# Patient Record
Sex: FEMALE | Race: WHITE | NOT HISPANIC OR LATINO | Employment: PART TIME | URBAN - METROPOLITAN AREA
[De-identification: names, ages, dates, MRNs, and addresses within clinical notes are randomized per-mention and may not be internally consistent; named-entity substitution may affect disease eponyms.]

---

## 2019-08-15 ENCOUNTER — TELEPHONE (OUTPATIENT)
Dept: FAMILY MEDICINE CLINIC | Facility: CLINIC | Age: 29
End: 2019-08-15

## 2019-08-15 DIAGNOSIS — M54.2 NECK PAIN: Primary | ICD-10-CM
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
## 2019-08-26 ENCOUNTER — OFFICE VISIT (OUTPATIENT)
Dept: PHYSICAL THERAPY | Facility: CLINIC | Age: 29
End: 2019-08-26

## 2019-08-26 DIAGNOSIS — M54.2 PAIN, NECK: Primary | ICD-10-CM

## 2019-08-26 PROCEDURE — 97162 PT EVAL MOD COMPLEX 30 MIN: CPT | Performed by: PHYSICAL THERAPIST

## 2019-08-26 PROCEDURE — 97110 THERAPEUTIC EXERCISES: CPT | Performed by: PHYSICAL THERAPIST

## 2019-08-26 PROCEDURE — 97140 MANUAL THERAPY 1/> REGIONS: CPT | Performed by: PHYSICAL THERAPIST
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
## 2019-08-30 ENCOUNTER — OFFICE VISIT (OUTPATIENT)
Dept: PHYSICAL THERAPY | Facility: CLINIC | Age: 29
End: 2019-08-30

## 2019-08-30 DIAGNOSIS — M54.2 PAIN, NECK: Primary | ICD-10-CM

## 2019-08-30 PROCEDURE — 97140 MANUAL THERAPY 1/> REGIONS: CPT | Performed by: PHYSICAL THERAPIST

## 2019-08-30 NOTE — PROGRESS NOTES
Physical Therapy Daily Progress Note    Patient: Miranda Paredes   : 1990  Diagnosis/ICD-10 Code:  Pain, neck [M54.2]  Referring practitioner: Pam VIRGEN*  Date of Initial Visit: Type: THERAPY  Noted: 2019  Today's Date: 2019  Patient seen for 2 sessions         Miranda Paredes reports:  Continued pain in cervical spine and LUE this week.       Objective   See Exercise, Manual, and Modality Logs for complete treatment.       Assessment/Plan    Reports continued tenderness with CPA to thoracic spine.  Patient tolerates treatment well.  Demonstrates proper technique with HEP.   Progress per Plan of Care           Manual Therapy:    20     mins  61421;  Therapeutic Exercise:    5     mins  62655;     Neuromuscular Teddy:        mins  87619;    Therapeutic Activity:          mins  05659;     Gait Training:           mins  22439;     Ultrasound:          mins  74897;    Electrical Stimulation:         mins  49689 ( );  Dry Needling          mins self-pay    Timed Treatment:   25   mins   Total Treatment:     35   mins    Toni Powell PT  Physical Therapist

## 2019-09-03 ENCOUNTER — OFFICE VISIT (OUTPATIENT)
Dept: PHYSICAL THERAPY | Facility: CLINIC | Age: 29
End: 2019-09-03

## 2019-09-03 DIAGNOSIS — M54.2 PAIN, NECK: Primary | ICD-10-CM

## 2019-09-03 PROCEDURE — 97110 THERAPEUTIC EXERCISES: CPT | Performed by: PHYSICAL THERAPIST

## 2019-09-03 PROCEDURE — 97140 MANUAL THERAPY 1/> REGIONS: CPT | Performed by: PHYSICAL THERAPIST

## 2019-09-03 NOTE — PROGRESS NOTES
Physical Therapy Daily Progress Note      Patient: Miranda Paredes   : 1990  Diagnosis/ICD-10 Code:  Pain, neck [M54.2]  Referring practitioner: Pam VIRGEN*  Date of Initial Visit: Type: THERAPY  Noted: 2019  Today's Date: 9/3/2019  Patient seen for 3 sessions         Miranda Paredes reports:  Continued discomfort in cervical spine, specifically increased at night.      Objective   See Exercise, Manual, and Modality Logs for complete treatment.       Assessment/Plan     Patient reports feeling slightly better, however still discomfort in LUE and cervical spine.  Patient tolerates newly added home exercise for shoulder ER well.  Observed improved right shoulder ER after manual therapy.     Progress per Plan of Care           Manual Therapy:    20     mins  65685;  Therapeutic Exercise:    15     mins  49673;     Neuromuscular Teddy:        mins  66550;    Therapeutic Activity:          mins  80654;     Gait Training:           mins  01145;     Ultrasound:          mins  31986;    Electrical Stimulation:         mins  28595 (MC );  Dry Needling          mins self-pay    Timed Treatment:   35   mins   Total Treatment:     35   mins    Toni Powell PT  Physical Therapist

## 2019-09-05 ENCOUNTER — OFFICE VISIT (OUTPATIENT)
Dept: PHYSICAL THERAPY | Facility: CLINIC | Age: 29
End: 2019-09-05

## 2019-09-05 DIAGNOSIS — M54.2 PAIN, NECK: Primary | ICD-10-CM

## 2019-09-05 PROCEDURE — 97140 MANUAL THERAPY 1/> REGIONS: CPT | Performed by: PHYSICAL THERAPIST

## 2019-09-05 PROCEDURE — 97110 THERAPEUTIC EXERCISES: CPT | Performed by: PHYSICAL THERAPIST

## 2019-09-05 NOTE — PROGRESS NOTES
Physical Therapy Daily Progress Note    Patient: Miranda Paredes   : 1990  Diagnosis/ICD-10 Code:  Pain, neck [M54.2]  Referring practitioner: Pam VIRGEN*  Date of Initial Visit: Type: THERAPY  Noted: 2019  Today's Date: 2019  Patient seen for 4 sessions         Miranda Paredes reports:  Decreased pain/numbness in left hand.  Reports continued pain in left shoulder and cervical spine at work.     Objective   See Exercise, Manual, and Modality Logs for complete treatment.       Assessment/Plan     Patient tolerates manual therapy well.  Observed full left elbow extension with ULNT (median n. Bias) after neural flossing.     Progress per Plan of Care           Manual Therapy:    15     mins  57966;  Therapeutic Exercise:    10     mins  17956;     Neuromuscular Teddy:        mins  41876;    Therapeutic Activity:          mins  96374;     Gait Training:           mins  70674;     Ultrasound:          mins  63719;    Electrical Stimulation:         mins  05548 ( );  Dry Needling          mins self-pay    Timed Treatment:   25   mins   Total Treatment:     25   mins    Toni Powell PT  Physical Therapist

## 2019-09-10 ENCOUNTER — OFFICE VISIT (OUTPATIENT)
Dept: PHYSICAL THERAPY | Facility: CLINIC | Age: 29
End: 2019-09-10

## 2019-09-10 DIAGNOSIS — M54.2 PAIN, NECK: Primary | ICD-10-CM

## 2019-09-10 PROCEDURE — 97110 THERAPEUTIC EXERCISES: CPT | Performed by: PHYSICAL THERAPIST

## 2019-09-10 PROCEDURE — 97140 MANUAL THERAPY 1/> REGIONS: CPT | Performed by: PHYSICAL THERAPIST

## 2019-09-10 NOTE — PROGRESS NOTES
"   Physical Therapy Daily Progress Note    Patient: Miranda Paredes   : 1990  Diagnosis/ICD-10 Code:  Pain, neck [M54.2]  Referring practitioner: Pam VIRGEN*  Date of Initial Visit: Type: THERAPY  Noted: 2019  Today's Date: 9/10/2019  Patient seen for 5 sessions         Miranda Paredes reports: Feeling better.  Reports only mild pain in left shoulder today during work.  Reports \"burning\" in bilateral scapula during work (medial border).     Objective   See Exercise, Manual, and Modality Logs for complete treatment.       Assessment/Plan     Patient tolerates manual therapy well.  Requires moderate verbal and tactile cueing for proper technique with newly added exercises.      Progress per Plan of Care           Manual Therapy:    25     mins  82960;  Therapeutic Exercise:    10     mins  20071;     Neuromuscular Teddy:        mins  44397;    Therapeutic Activity:          mins  11760;     Gait Training:           mins  05503;     Ultrasound:          mins  39617;    Electrical Stimulation:         mins  11693 ( );  Dry Needling          mins self-pay    Timed Treatment:   35   mins   Total Treatment:     35   mins    Toni Powell PT  Physical Therapist                    "

## 2019-09-12 ENCOUNTER — OFFICE VISIT (OUTPATIENT)
Dept: PHYSICAL THERAPY | Facility: CLINIC | Age: 29
End: 2019-09-12

## 2019-09-12 DIAGNOSIS — M54.2 PAIN, NECK: Primary | ICD-10-CM

## 2019-09-12 PROCEDURE — 97140 MANUAL THERAPY 1/> REGIONS: CPT | Performed by: PHYSICAL THERAPIST

## 2019-09-16 ENCOUNTER — OFFICE VISIT (OUTPATIENT)
Dept: PHYSICAL THERAPY | Facility: CLINIC | Age: 29
End: 2019-09-16

## 2019-09-16 DIAGNOSIS — M54.2 PAIN, NECK: Primary | ICD-10-CM

## 2019-09-16 PROCEDURE — 97012 MECHANICAL TRACTION THERAPY: CPT | Performed by: PHYSICAL THERAPIST

## 2019-09-16 PROCEDURE — 97110 THERAPEUTIC EXERCISES: CPT | Performed by: PHYSICAL THERAPIST

## 2019-09-16 NOTE — PROGRESS NOTES
"   Physical Therapy Daily Progress Note      Patient: Miranda Paredes   : 1990  Diagnosis/ICD-10 Code:  Pain, neck [M54.2]  Referring practitioner: Pam VIRGEN*  Date of Initial Visit: Type: THERAPY  Noted: 2019  Today's Date: 2019  Patient seen for 7 sessions         Miranda Paredes reports:  That she \"aggravated neck\" over the weekend.  Reports soreness in cervical spine today.     Objective   See Exercise, Manual, and Modality Logs for complete treatment.       Assessment/Plan     Reports feeling better after mechanical traction.  Demonstrates proper technique with exercise.      Progress per Plan of Care           Manual Therapy:         mins  47562;  Therapeutic Exercise:    10     mins  28317;     Neuromuscular Teddy:        mins  33670;    Therapeutic Activity:          mins  47274;     Gait Training:           mins  67966;     Ultrasound:          mins  37740;    Electrical Stimulation:         mins  96009 ( );  Dry Needling          mins self-pay  Traction  15 mins 27985    Timed Treatment:   25   mins   Total Treatment:     25   mins    Toni Powell PT  Physical Therapist                    "

## 2019-09-26 ENCOUNTER — TREATMENT (OUTPATIENT)
Dept: PHYSICAL THERAPY | Facility: CLINIC | Age: 29
End: 2019-09-26

## 2019-09-26 DIAGNOSIS — M54.2 PAIN, NECK: Primary | ICD-10-CM

## 2019-09-26 PROCEDURE — 97140 MANUAL THERAPY 1/> REGIONS: CPT | Performed by: PHYSICAL THERAPIST

## 2019-09-26 NOTE — PROGRESS NOTES
Physical Therapy Daily Progress Note    Patient: Miranda Paredes   : 1990  Diagnosis/ICD-10 Code:  Pain, neck [M54.2]  Referring practitioner: Pam VIRGEN*  Date of Initial Visit: Type: THERAPY  Noted: 2019  Today's Date: 2019  Patient seen for 8 sessions         Miranda Paredes reports:  Continued tingling in LUE, however reports decreased frequency of symptoms.      Objective   See Exercise, Manual, and Modality Logs for complete treatment.       Assessment/Plan     Observed hypomobility in bilateral OA joint, address with manual therapy.  Patient tolerates manual therapy well.  Patient educated on importance of continuing home exercise regimen.     Progress per Plan of Care           Manual Therapy:    25     mins  62524;  Therapeutic Exercise:         mins  49929;     Neuromuscular Teddy:        mins  38533;    Therapeutic Activity:          mins  04683;     Gait Training:           mins  29690;     Ultrasound:          mins  78091;    Electrical Stimulation:         mins  18780 ( );  Dry Needling          mins self-pay    Timed Treatment:   25   mins   Total Treatment:     35   mins    Toni Powell PT  Physical Therapist

## 2019-09-30 ENCOUNTER — TREATMENT (OUTPATIENT)
Dept: PHYSICAL THERAPY | Facility: CLINIC | Age: 29
End: 2019-09-30

## 2019-09-30 DIAGNOSIS — M54.2 PAIN, NECK: Primary | ICD-10-CM

## 2019-09-30 PROCEDURE — 97140 MANUAL THERAPY 1/> REGIONS: CPT | Performed by: PHYSICAL THERAPIST

## 2019-09-30 NOTE — PROGRESS NOTES
Physical Therapy Daily Progress Note    Patient: Miranda Paredes   : 1990  Diagnosis/ICD-10 Code:  Pain, neck [M54.2]  Referring practitioner: Pam VIRGEN*  Date of Initial Visit: Type: THERAPY  Noted: 2019  Today's Date: 2019  Patient seen for 9 sessions         Miranda Paredes reports: increased stress at work last Thursday and reports increase in neck pain/left shoulder pain since.     Objective   See Exercise, Manual, and Modality Logs for complete treatment.       Assessment/Plan     Reports decreased pain after today's treatment.  Demonstrates proper technique with newly added home exercises.      Progress per Plan of Care           Manual Therapy:    25     mins  52086;  Therapeutic Exercise:    5     mins  65568;     Neuromuscular Teddy:        mins  11142;    Therapeutic Activity:          mins  58461;     Gait Training:           mins  34429;     Ultrasound:          mins  85493;    Electrical Stimulation:         mins  99774 ( );  Dry Needling          mins self-pay    Timed Treatment:   30   mins   Total Treatment:     40   mins    Toni Powell PT  Physical Therapist

## 2019-10-03 ENCOUNTER — TREATMENT (OUTPATIENT)
Dept: PHYSICAL THERAPY | Facility: CLINIC | Age: 29
End: 2019-10-03

## 2019-10-03 DIAGNOSIS — M54.2 PAIN, NECK: Primary | ICD-10-CM

## 2019-10-03 PROCEDURE — 97140 MANUAL THERAPY 1/> REGIONS: CPT | Performed by: PHYSICAL THERAPIST

## 2019-10-03 PROCEDURE — 97110 THERAPEUTIC EXERCISES: CPT | Performed by: PHYSICAL THERAPIST

## 2019-10-07 ENCOUNTER — TREATMENT (OUTPATIENT)
Dept: PHYSICAL THERAPY | Facility: CLINIC | Age: 29
End: 2019-10-07

## 2019-10-07 DIAGNOSIS — M54.2 PAIN, NECK: Primary | ICD-10-CM

## 2019-10-07 PROCEDURE — 97140 MANUAL THERAPY 1/> REGIONS: CPT | Performed by: PHYSICAL THERAPIST

## 2019-10-07 PROCEDURE — 97110 THERAPEUTIC EXERCISES: CPT | Performed by: PHYSICAL THERAPIST

## 2019-10-07 NOTE — PROGRESS NOTES
Physical Therapy Daily Progress Note      Patient: Miranda Paredes   : 1990  Diagnosis/ICD-10 Code:  Pain, neck [M54.2]  Referring practitioner: Pam VIRGEN*  Date of Initial Visit: Type: THERAPY  Noted: 2019  Today's Date: 10/7/2019  Patient seen for 11 sessions         Miranda Paredes reports:  Feeling well today.  Reports mild pain in right shoulder today at work, however overall feeling much better.     Objective   See Exercise, Manual, and Modality Logs for complete treatment.       Assessment/Plan     Tolerates progression of exercise well today.  Patient progressing well.     Progress per Plan of Care           Manual Therapy:    10     mins  81548;  Therapeutic Exercise:    20     mins  46938;     Neuromuscular Teddy:        mins  87795;    Therapeutic Activity:          mins  76245;     Gait Training:           mins  95392;     Ultrasound:          mins  20179;    Electrical Stimulation:         mins  49668 ( );  Dry Needling          mins self-pay    Timed Treatment:   30   mins   Total Treatment:     30   mins    Toni Powell PT  Physical Therapist

## 2019-10-10 ENCOUNTER — TREATMENT (OUTPATIENT)
Dept: PHYSICAL THERAPY | Facility: CLINIC | Age: 29
End: 2019-10-10

## 2019-10-10 DIAGNOSIS — M54.2 PAIN, NECK: Primary | ICD-10-CM

## 2019-10-10 PROCEDURE — 97140 MANUAL THERAPY 1/> REGIONS: CPT | Performed by: PHYSICAL THERAPIST

## 2019-10-10 NOTE — PROGRESS NOTES
Physical Therapy Daily Progress Note      Patient: Miranda Paredes   : 1990  Diagnosis/ICD-10 Code:  Pain, neck [M54.2]  Referring practitioner: Pam VIRGEN*  Date of Initial Visit: Type: THERAPY  Noted: 2019  Today's Date: 10/10/2019  Patient seen for 12 sessions         Miranda Paredes reports:  Numbness in both hands this morning after waking up on her left side.  Reports that symptoms has subsided at this time.     Objective   See Exercise, Manual, and Modality Logs for complete treatment.       Assessment/Plan     Tolerates manual therapy well.  Hypomobility noted in thoracic spine.  Patient compliant with HEP.     Progress per Plan of Care           Manual Therapy:    20     mins  83566;  Therapeutic Exercise:         mins  18666;     Neuromuscular Teddy:        mins  87842;    Therapeutic Activity:          mins  14454;     Gait Training:           mins  71966;     Ultrasound:          mins  33922;    Electrical Stimulation:         mins  82012 ( );  Dry Needling          mins self-pay    Timed Treatment:   20   mins   Total Treatment:     20   mins    Toni Powell PT  Physical Therapist

## 2019-10-15 ENCOUNTER — TREATMENT (OUTPATIENT)
Dept: PHYSICAL THERAPY | Facility: CLINIC | Age: 29
End: 2019-10-15

## 2019-10-15 DIAGNOSIS — M54.2 PAIN, NECK: Primary | ICD-10-CM

## 2019-10-15 PROCEDURE — 97140 MANUAL THERAPY 1/> REGIONS: CPT | Performed by: PHYSICAL THERAPIST

## 2019-10-15 NOTE — PROGRESS NOTES
Physical Therapy Daily Progress Note    Patient: Miranda Paredes   : 1990  Diagnosis/ICD-10 Code:  Pain, neck [M54.2]  Referring practitioner: Pam VIRGEN*  Date of Initial Visit: Type: THERAPY  Noted: 2019  Today's Date: 10/15/2019  Patient seen for 13 sessions         Miranda Paredes reports: Increased symptoms over the past few days due to not getting much sleep and working a lot.      Objective   See Exercise, Manual, and Modality Logs for complete treatment.       Assessment/Plan     Decreased pain reported post-treatment.  Patient educated to gradually increase reps with home exercise over next week.     Progress per Plan of Care           Manual Therapy:    20     mins  70656;  Therapeutic Exercise:    5     mins  49562;     Neuromuscular Teddy:        mins  63705;    Therapeutic Activity:          mins  51570;     Gait Training:           mins  78751;     Ultrasound:          mins  85649;    Electrical Stimulation:         mins  84122 ( );  Dry Needling          mins self-pay    Timed Treatment:   25   mins   Total Treatment:     25   mins    Toni Powell PT  Physical Therapist

## 2019-10-21 ENCOUNTER — TREATMENT (OUTPATIENT)
Dept: PHYSICAL THERAPY | Facility: CLINIC | Age: 29
End: 2019-10-21

## 2019-10-21 DIAGNOSIS — M54.2 PAIN, NECK: Primary | ICD-10-CM

## 2019-10-21 PROCEDURE — 97140 MANUAL THERAPY 1/> REGIONS: CPT | Performed by: PHYSICAL THERAPIST

## 2019-10-21 NOTE — PROGRESS NOTES
Physical Therapy Daily Progress Note    Patient: Miranda Paredes   : 1990  Diagnosis/ICD-10 Code:  Pain, neck [M54.2]  Referring practitioner: Pam VIRGEN*  Date of Initial Visit: Type: THERAPY  Noted: 2019  Today's Date: 10/21/2019  Patient seen for 14 sessions         Miranda Paredes reports:  Slight decrease in symptoms after previous treatment, however increased pain after work today.     Objective   See Exercise, Manual, and Modality Logs for complete treatment.       Assessment/Plan    Slight improvement after manual therapy.  Patient reports no pain in right side of cervical spine with movement after DN treatment.  Patient tolerates treatment well.   Progress per Plan of Care           Manual Therapy:    15     mins  09361;  Therapeutic Exercise:         mins  39967;     Neuromuscular Teddy:        mins  32389;    Therapeutic Activity:          mins  82685;     Gait Training:           mins  38308;     Ultrasound:          mins  04352;    Electrical Stimulation:         mins  58845 ( );  Dry Needling     8     mins self-pay    Timed Treatment:   15   mins   Total Treatment:     23   mins    Toni Powell PT  Physical Therapist

## 2019-10-24 ENCOUNTER — TREATMENT (OUTPATIENT)
Dept: PHYSICAL THERAPY | Facility: CLINIC | Age: 29
End: 2019-10-24

## 2019-10-24 DIAGNOSIS — M54.2 PAIN, NECK: Primary | ICD-10-CM

## 2019-10-24 PROCEDURE — 97140 MANUAL THERAPY 1/> REGIONS: CPT | Performed by: PHYSICAL THERAPIST

## 2019-10-24 NOTE — PROGRESS NOTES
Physical Therapy Daily Progress Note    Patient: Miranda Paredes   : 1990  Diagnosis/ICD-10 Code:  Pain, neck [M54.2]  Referring practitioner: Pam VIRGEN*  Date of Initial Visit: Type: THERAPY  Noted: 2019  Today's Date: 10/24/2019  Patient seen for 15 sessions         Miranda Paredes reports: Continued improving AROM in cervical spine after dry needling treatment.  Mild stiffness in left upper trapezius still present today.     Objective   See Exercise, Manual, and Modality Logs for complete treatment.       Assessment/Plan     Patient tolerates manual therapy well this treatment.      Progress per Plan of Care           Manual Therapy:    20     mins  14612;  Therapeutic Exercise:         mins  35031;     Neuromuscular Teddy:        mins  77415;    Therapeutic Activity:          mins  37652;     Gait Training:           mins  79214;     Ultrasound:          mins  93363;    Electrical Stimulation:        mins  72000 ( );  Dry Needling          mins self-pay    Timed Treatment:   20   mins   Total Treatment:     20   mins    Toni Powell PT  Physical Therapist

## 2019-10-31 ENCOUNTER — TREATMENT (OUTPATIENT)
Dept: PHYSICAL THERAPY | Facility: CLINIC | Age: 29
End: 2019-10-31

## 2019-10-31 DIAGNOSIS — M54.2 PAIN, NECK: Primary | ICD-10-CM

## 2019-10-31 PROCEDURE — 97140 MANUAL THERAPY 1/> REGIONS: CPT | Performed by: PHYSICAL THERAPIST

## 2019-10-31 NOTE — PROGRESS NOTES
Physical Therapy Daily Progress Note    Patient: Miranda Paredes   : 1990  Diagnosis/ICD-10 Code:  Pain, neck [M54.2]  Referring practitioner: Pam VIRGEN*  Date of Initial Visit: Type: THERAPY  Noted: 2019  Today's Date: 10/31/2019  Patient seen for 16 sessions         Miranda Paredes reports: mild discomfort in left upper trapezius.  Doing better overall.     Objective   See Exercise, Manual, and Modality Logs for complete treatment.       Assessment/Plan    Patient arrives late to today's session and only has time for manual treatment due to needing to go to work.  Patient reports decreased pain/discomfort after treatment.  Patient to continue with current HEP.  Progress per Plan of Care           Manual Therapy:    15     mins  37116;  Therapeutic Exercise:         mins  43599;     Neuromuscular Teddy:        mins  24918;    Therapeutic Activity:          mins  16456;     Gait Training:           mins  60018;     Ultrasound:          mins  64033;    Electrical Stimulation:         mins  12869 ( );  Dry Needling          mins self-pay    Timed Treatment:   15   mins   Total Treatment:     15   mins    Toni Powell PT  Physical Therapist

## 2019-11-11 ENCOUNTER — TREATMENT (OUTPATIENT)
Dept: PHYSICAL THERAPY | Facility: CLINIC | Age: 29
End: 2019-11-11

## 2019-11-11 DIAGNOSIS — M54.2 PAIN, NECK: Primary | ICD-10-CM

## 2019-11-11 PROCEDURE — 97110 THERAPEUTIC EXERCISES: CPT | Performed by: PHYSICAL THERAPIST

## 2019-11-11 PROCEDURE — 97140 MANUAL THERAPY 1/> REGIONS: CPT | Performed by: PHYSICAL THERAPIST

## 2019-11-14 ENCOUNTER — TREATMENT (OUTPATIENT)
Dept: PHYSICAL THERAPY | Facility: CLINIC | Age: 29
End: 2019-11-14

## 2019-11-14 DIAGNOSIS — M54.2 PAIN, NECK: Primary | ICD-10-CM

## 2019-11-14 PROCEDURE — 97140 MANUAL THERAPY 1/> REGIONS: CPT | Performed by: PHYSICAL THERAPIST

## 2019-11-14 NOTE — PROGRESS NOTES
Physical Therapy Daily Progress Note    Patient: Miranda Paredes   : 1990  Diagnosis/ICD-10 Code:  Pain, neck [M54.2]  Referring practitioner: Pam VIRGEN*  Date of Initial Visit: Type: THERAPY  Noted: 2019  Today's Date: 2019  Patient seen for 18 sessions         Miranda Paredes reports:   Soreness in left shoulder/neck over the past few days.  Worse during and after work.  Better overall from last week.     Objective   See Exercise, Manual, and Modality Logs for complete treatment.       Assessment/Plan     Tolerates manual therapy well this treatment.  Noted continued soreness at inferior angle of left scapula.  Patient to continue with scapular stabilization exercise with HEP.     Progress per Plan of Care           Manual Therapy:    25     mins  00958;  Therapeutic Exercise:         mins  49645;     Neuromuscular Teddy:        mins  19546;    Therapeutic Activity:          mins  90361;     Gait Training:           mins  38157;     Ultrasound:          mins  26274;    Electrical Stimulation:         mins  28084 ( );  Dry Needling          mins self-pay    Timed Treatment:   25   mins   Total Treatment:     25   mins    Toni Powell PT  Physical Therapist

## 2019-11-21 ENCOUNTER — TREATMENT (OUTPATIENT)
Dept: PHYSICAL THERAPY | Facility: CLINIC | Age: 29
End: 2019-11-21

## 2019-11-21 DIAGNOSIS — M54.2 PAIN, NECK: Primary | ICD-10-CM

## 2019-11-21 PROCEDURE — 97140 MANUAL THERAPY 1/> REGIONS: CPT | Performed by: PHYSICAL THERAPIST

## 2019-11-21 NOTE — PROGRESS NOTES
"   Physical Therapy Daily Progress Note      Patient: Miranda Paredes   : 1990  Diagnosis/ICD-10 Code:  Pain, neck [M54.2]  Referring practitioner: Pam VIRGEN*  Date of Initial Visit: Type: THERAPY  Noted: 2019  Today's Date: 2019  Patient seen for 19 sessions         Miranda Paredes reports:  Decreased pain in cervical spine, just sore near \"base of neck\".  Patient also repots soreness near medial border of scapula (bilaterally).     Objective   See Exercise, Manual, and Modality Logs for complete treatment.       Assessment/Plan    Tolerates manual therapy well this visit.  Patient educated on continuing/increasing frequency of HEP.  Patient progressing well.   Progress per Plan of Care           Manual Therapy:    25     mins  00409;  Therapeutic Exercise:    5     mins  50008;     Neuromuscular Teddy:        mins  65194;    Therapeutic Activity:          mins  84110;     Gait Training:           mins  53279;     Ultrasound:          mins  32352;    Electrical Stimulation:         mins  57472 ( );  Dry Needling          mins self-pay    Timed Treatment:   30   mins   Total Treatment:     30   mins    Toni Powell PT  Physical Therapist                    "

## 2019-11-25 ENCOUNTER — TREATMENT (OUTPATIENT)
Dept: PHYSICAL THERAPY | Facility: CLINIC | Age: 29
End: 2019-11-25

## 2019-11-25 DIAGNOSIS — M54.2 PAIN, NECK: Primary | ICD-10-CM

## 2019-11-25 PROCEDURE — 97110 THERAPEUTIC EXERCISES: CPT | Performed by: PHYSICAL THERAPIST

## 2019-11-25 PROCEDURE — 97140 MANUAL THERAPY 1/> REGIONS: CPT | Performed by: PHYSICAL THERAPIST

## 2019-11-25 NOTE — PROGRESS NOTES
Physical Therapy Daily Progress Note      Patient: Miranda Paredes   : 1990  Diagnosis/ICD-10 Code:  Pain, neck [M54.2]  Referring practitioner: Pam VIRGEN*  Date of Initial Visit: Type: THERAPY  Noted: 2019  Today's Date: 2019  Patient seen for 20 sessions         Miranda Paredes reports:  Only discomfort in left scapula today.  Feeling well with work.     Objective   See Exercise, Manual, and Modality Logs for complete treatment.       Assessment/Plan     Reports decreased pain after manual therapy. Tolerates progression of exercise well this treatment.      Progress per Plan of Care           Manual Therapy:    15     mins  74505;  Therapeutic Exercise:    15     mins  40675;     Neuromuscular Teddy:        mins  64240;    Therapeutic Activity:          mins  24896;     Gait Training:           mins  71501;     Ultrasound:          mins  23970;    Electrical Stimulation:         mins  01438 ( );  Dry Needling          mins self-pay    Timed Treatment:   30   mins   Total Treatment:     30   mins    Toni Powell PT  Physical Therapist

## 2019-11-27 ENCOUNTER — TREATMENT (OUTPATIENT)
Dept: PHYSICAL THERAPY | Facility: CLINIC | Age: 29
End: 2019-11-27

## 2019-11-27 DIAGNOSIS — M54.2 PAIN, NECK: Primary | ICD-10-CM

## 2019-11-27 PROCEDURE — 97140 MANUAL THERAPY 1/> REGIONS: CPT | Performed by: PHYSICAL THERAPIST

## 2019-11-27 PROCEDURE — 97110 THERAPEUTIC EXERCISES: CPT | Performed by: PHYSICAL THERAPIST

## 2019-11-27 NOTE — PROGRESS NOTES
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  Reports compliance with HEP and overall improvement of symptoms.     Objective   See Exercise, Manual, and Modality Logs for complete treatment.       Assessment/Plan    Continued hypomobility of left scapula noted during manual therapy.  Patient tolerates exercise well this treatment.  Progressing well.     Progress per Plan of Care           Manual Therapy:    15     mins  18337;  Therapeutic Exercise:    15     mins  98806;     Neuromuscular Teddy:        mins  47078;    Therapeutic Activity:          mins  75117;     Gait Training:           mins  56286;     Ultrasound:          mins  30870;    Electrical Stimulation:         mins  32088 (MC );  Dry Needling          mins self-pay    Timed Treatment:   30   mins   Total Treatment:     30   mins    Toni Powell PT  Physical Therapist

## 2019-12-10 ENCOUNTER — TREATMENT (OUTPATIENT)
Dept: PHYSICAL THERAPY | Facility: CLINIC | Age: 29
End: 2019-12-10

## 2019-12-10 DIAGNOSIS — M54.2 PAIN, NECK: Primary | ICD-10-CM

## 2019-12-10 PROCEDURE — 97140 MANUAL THERAPY 1/> REGIONS: CPT | Performed by: PHYSICAL THERAPIST

## 2019-12-10 PROCEDURE — 97110 THERAPEUTIC EXERCISES: CPT | Performed by: PHYSICAL THERAPIST

## 2019-12-10 NOTE — PROGRESS NOTES
Physical Therapy Daily Progress Note      Patient: Miranda Paredes   : 1990  Diagnosis/ICD-10 Code:  Pain, neck [M54.2]  Referring practitioner: Pam VIRGEN*  Date of Initial Visit: Type: THERAPY  Noted: 2019  Today's Date: 12/10/2019  Patient seen for 22 sessions         Miranda Paredes reports:  Still discomfort in left scapula.  Reports only mild pain in right shoulder/cervical spine with work today.     Objective   See Exercise, Manual, and Modality Logs for complete treatment.       Assessment/Plan    Tolerates manual therapy well.  Noted patient reports decreased soreness after manual therapy, specifically to left medial scapula.  Patient only requires minimal verbal cueing for body blade exercise for proper scapular retraction.  Patient progressing well.     Progress per Plan of Care           Manual Therapy:    15     mins  86922;  Therapeutic Exercise:    10     mins  74847;     Neuromuscular Teddy:        mins  15928;    Therapeutic Activity:          mins  69372;     Gait Training:           mins  40220;     Ultrasound:          mins  90420;    Electrical Stimulation:         mins  16262 ( );  Dry Needling          mins self-pay    Timed Treatment:   25   mins   Total Treatment:     25   mins    Toni Powell PT  Physical Therapist

## 2019-12-17 ENCOUNTER — TREATMENT (OUTPATIENT)
Dept: PHYSICAL THERAPY | Facility: CLINIC | Age: 29
End: 2019-12-17

## 2019-12-17 DIAGNOSIS — M54.2 PAIN, NECK: Primary | ICD-10-CM

## 2019-12-17 PROCEDURE — 97110 THERAPEUTIC EXERCISES: CPT | Performed by: PHYSICAL THERAPIST

## 2019-12-17 PROCEDURE — 97140 MANUAL THERAPY 1/> REGIONS: CPT | Performed by: PHYSICAL THERAPIST

## 2019-12-17 NOTE — PROGRESS NOTES
Physical Therapy Daily Progress Note        Patient: Miranda Paredes   : 1990  Diagnosis/ICD-10 Code:  Pain, neck [M54.2]  Referring practitioner: Pam VIRGEN*  Date of Initial Visit: Type: THERAPY  Noted: 2019  Today's Date: 2019  Patient seen for 23 sessions         Miranda Paredes reports:  Patient feeling well overall.  Reports still some soreness at medial border of left scapula, however gradually improving.  Reports still some stiffness/pain in bilateral upper trapezius, however improving.  Compliant with HEP when she has time or break at work.     Objective   See Exercise, Manual, and Modality Logs for complete treatment.       Assessment/Plan     Tolerates manual therapy well.  Patient does not need verbal cueing for proper technique with exercise.  Progressing well.  Transition to I HEP.     Progress per Plan of Care           Manual Therapy:    10     mins  23348;  Therapeutic Exercise:    15     mins  92605;     Neuromuscular Teddy:        mins  60378;    Therapeutic Activity:          mins  13035;     Gait Training:           mins  99444;     Ultrasound:          mins  77009;    Electrical Stimulation:         mins  91643 ( );  Dry Needling          mins self-pay    Timed Treatment:   25   mins   Total Treatment:     25   mins    Toni Powell PT  Physical Therapist

## 2020-01-06 ENCOUNTER — TREATMENT (OUTPATIENT)
Dept: PHYSICAL THERAPY | Facility: CLINIC | Age: 30
End: 2020-01-06

## 2020-01-06 DIAGNOSIS — M54.2 PAIN, NECK: Primary | ICD-10-CM

## 2020-01-06 PROCEDURE — PTNOCHG PR CUSTOM PT NO CHARGE VISIT: Performed by: PHYSICAL THERAPIST

## 2020-01-06 NOTE — PROGRESS NOTES
Physical Therapy Daily Progress Note      Patient: Miranda Paredes   : 1990  Diagnosis/ICD-10 Code:  Pain, neck [M54.2]  Referring practitioner: Pam VIRGEN*  Date of Initial Visit: Type: THERAPY  Noted: 2019  Today's Date: 2020  Patient seen for 24 sessions         Miranda Paredes reports:  Been sick over the past two weeks.  Reports mild discomfort near left scapula, however feeling well.  Reports compliance with HEP.     Objective   See Exercise, Manual, and Modality Logs for complete treatment.       Assessment/Plan     Patient to continue with I HEP and return for treatment is s/s return in next month.      Anticipate DC next Visit           Manual Therapy:         mins  92187;  Therapeutic Exercise:    5     mins  69016;     Neuromuscular Teddy:        mins  66688;    Therapeutic Activity:          mins  18318;     Gait Training:           mins  27895;     Ultrasound:          mins  18063;    Electrical Stimulation:         mins  40377 ( );  Dry Needling          mins self-pay    Timed Treatment:  5    mins   Total Treatment:      5  mins    Toni Powell PT  Physical Therapist

## 2021-02-16 ENCOUNTER — DOCUMENTATION (OUTPATIENT)
Dept: PHYSICAL THERAPY | Facility: CLINIC | Age: 31
End: 2021-02-16

## 2021-02-16 NOTE — PROGRESS NOTES
Discharge Summary  Discharge Summary from Physical Therapy Report      Number of Visits: 24         Goals: All Met    Discharge Plan: Continue with current home exercise program as instructed    Date of Discharge 2/16/21        Toni Powell PT  Physical Therapist

## 2021-12-06 ENCOUNTER — OFFICE VISIT (OUTPATIENT)
Dept: FAMILY MEDICINE CLINIC | Facility: CLINIC | Age: 31
End: 2021-12-06

## 2021-12-06 ENCOUNTER — TRANSCRIBE ORDERS (OUTPATIENT)
Dept: LAB | Facility: HOSPITAL | Age: 31
End: 2021-12-06

## 2021-12-06 ENCOUNTER — LAB (OUTPATIENT)
Dept: LAB | Facility: HOSPITAL | Age: 31
End: 2021-12-06

## 2021-12-06 VITALS
SYSTOLIC BLOOD PRESSURE: 136 MMHG | TEMPERATURE: 99.5 F | OXYGEN SATURATION: 95 % | BODY MASS INDEX: 43.4 KG/M2 | HEART RATE: 100 BPM | HEIGHT: 69 IN | DIASTOLIC BLOOD PRESSURE: 87 MMHG | WEIGHT: 293 LBS

## 2021-12-06 DIAGNOSIS — J06.9 UPPER RESPIRATORY TRACT INFECTION, UNSPECIFIED TYPE: ICD-10-CM

## 2021-12-06 DIAGNOSIS — Z11.52 ENCOUNTER FOR SCREENING FOR SEVERE ACUTE RESPIRATORY SYNDROME CORONAVIRUS 2 (SARS-COV-2) INFECTION: ICD-10-CM

## 2021-12-06 DIAGNOSIS — Z20.822 CLOSE EXPOSURE TO COVID-19 VIRUS: Primary | ICD-10-CM

## 2021-12-06 DIAGNOSIS — Z11.52 ENCOUNTER FOR SCREENING FOR SEVERE ACUTE RESPIRATORY SYNDROME CORONAVIRUS 2 (SARS-COV-2) INFECTION: Primary | ICD-10-CM

## 2021-12-06 PROCEDURE — C9803 HOPD COVID-19 SPEC COLLECT: HCPCS

## 2021-12-06 PROCEDURE — 99213 OFFICE O/P EST LOW 20 MIN: CPT | Performed by: FAMILY MEDICINE

## 2021-12-06 PROCEDURE — U0004 COV-19 TEST NON-CDC HGH THRU: HCPCS

## 2021-12-06 RX ORDER — METFORMIN HYDROCHLORIDE 750 MG/1
750 TABLET, EXTENDED RELEASE ORAL
COMMUNITY

## 2021-12-06 RX ORDER — LABETALOL 100 MG/1
100 TABLET, FILM COATED ORAL 2 TIMES DAILY
COMMUNITY

## 2021-12-06 NOTE — PROGRESS NOTES
Assessment and Plan     Problem List Items Addressed This Visit     None      Visit Diagnoses     Close exposure to COVID-19 virus    -  Primary    Relevant Orders    Respiratory Panel PCR w/COVID-19(SARS-CoV-2) [facility] In-House, NP Swab in UTM/VTM, 3-4 HR TAT - Swab, Nasopharynx    Upper respiratory tract infection, unspecified type        Relevant Orders    Respiratory Panel PCR w/COVID-19(SARS-CoV-2) [facility] In-House, NP Swab in UTM/VTM, 3-4 HR TAT - Swab, Nasopharynx        Discussed supportive care.   Educational material in AVS.  Advised to quarantine until December 15, 2021 based on onset of symptoms.  Encouraged COVID immunization.     Return if symptoms worsen or fail to improve.        Patient was given instructions and counseling regarding her condition or for health maintenance advice. Please see specific information pulled into the AVS if appropriate.        Miranda is a 31 y.o. being seen today for  Fever (symptoms started yesterday. rapid covid test yesterday negative.  tested pos last thursday and coworker tested pos last wed), Generalized Body Aches, and Nasal Congestion {Chief Complaint :23}  Subjective   History of the Present Illness     Morbidly obese white female with CMHx of HTN & DM II presents with complaints of URI.    Exposed to COVID through co-worker on Wednesday and  on Thursday.  has been quarantined in couple's bedroom.    Symptoms began yesterday. Complains of fever, Tmax 101.4F.  Afebrile in office. Associated symptoms include cough, congestion, and malaise. Denies any loss of taste or smell. Therapy initiated at home include 1300 mg Tylenol x1. Reports a negative rapid test yesterday. Has not been vaccinated against SARS-CoV-2.     Social History  She  reports that she has never smoked. She has never used smokeless tobacco. She reports current alcohol use. She reports that she does not use drugs.  Objective   Vital  "Signs          Vitals:    12/06/21 1026   BP: 136/87   BP Location: Left arm   Patient Position: Sitting   Cuff Size: Large Adult   Pulse: 100   Temp: 99.5 °F (37.5 °C)   TempSrc: Infrared   SpO2: 95%   Weight: (!) 142 kg (314 lb)   Height: 175.3 cm (69\")     BP Readings from Last 1 Encounters:   12/06/21 136/87     Wt Readings from Last 3 Encounters:   12/06/21 (!) 142 kg (314 lb)   10/07/16 (!) 137 kg (301 lb 8 oz)   Body mass index is 46.37 kg/m².     Physical Exam  Vitals reviewed.   Constitutional:       General: She is not in acute distress.     Appearance: She is well-developed. She is morbidly obese. She is not diaphoretic.   HENT:      Head: Normocephalic and atraumatic.      Right Ear: Tympanic membrane and ear canal normal.      Left Ear: Tympanic membrane and ear canal normal.      Mouth/Throat:      Mouth: Mucous membranes are moist.      Pharynx: Oropharynx is clear.   Eyes:      Extraocular Movements: Extraocular movements intact.      Pupils: Pupils are equal, round, and reactive to light.   Neck:      Thyroid: Thyromegaly present.   Cardiovascular:      Rate and Rhythm: Normal rate and regular rhythm.   Pulmonary:      Effort: Pulmonary effort is normal.      Breath sounds: Normal breath sounds.   Lymphadenopathy:      Cervical: No cervical adenopathy.   Skin:     General: Skin is warm and dry.   Neurological:      Mental Status: She is alert and oriented to person, place, and time.   Psychiatric:         Mood and Affect: Mood normal.         Behavior: Behavior normal.               Respiratory Panel PCR w/COVID-19(SARS-CoV-2) [facility] In-House, NP Swab in UTM/VTM, 3-4 HR TAT - , (11/30/2020 12:13)    "

## 2021-12-07 LAB — SARS-COV-2 ORF1AB RESP QL NAA+PROBE: DETECTED

## 2022-09-01 ENCOUNTER — TELEPHONE (OUTPATIENT)
Dept: ONCOLOGY | Facility: OTHER | Age: 32
End: 2022-09-01

## 2023-04-27 ENCOUNTER — HOSPITAL ENCOUNTER (EMERGENCY)
Facility: HOSPITAL | Age: 33
Discharge: HOME | End: 2023-04-27
Attending: EMERGENCY MEDICINE
Payer: COMMERCIAL

## 2023-04-27 VITALS
HEIGHT: 69 IN | RESPIRATION RATE: 16 BRPM | OXYGEN SATURATION: 99 % | SYSTOLIC BLOOD PRESSURE: 136 MMHG | WEIGHT: 290.79 LBS | BODY MASS INDEX: 43.07 KG/M2 | TEMPERATURE: 98.1 F | DIASTOLIC BLOOD PRESSURE: 89 MMHG | HEART RATE: 80 BPM

## 2023-04-27 DIAGNOSIS — I10 HYPERTENSION, UNSPECIFIED TYPE: Primary | ICD-10-CM

## 2023-04-27 DIAGNOSIS — R00.2 PALPITATIONS: ICD-10-CM

## 2023-04-27 LAB
ANION GAP SERPL CALCULATED.3IONS-SCNC: 11 MMOL/L (ref 5–15)
BACTERIA UR QL AUTO: ABNORMAL /HPF
BASOPHILS # BLD AUTO: 0.1 10*3/MM3 (ref 0–0.2)
BASOPHILS NFR BLD AUTO: 0.6 % (ref 0–1.5)
BILIRUB UR QL STRIP: NEGATIVE
BUN SERPL-MCNC: 10 MG/DL (ref 6–20)
BUN/CREAT SERPL: 13.7 (ref 7–25)
CALCIUM SPEC-SCNC: 9.2 MG/DL (ref 8.6–10.5)
CHLORIDE SERPL-SCNC: 101 MMOL/L (ref 98–107)
CLARITY UR: ABNORMAL
CO2 SERPL-SCNC: 23 MMOL/L (ref 22–29)
COLOR UR: YELLOW
CREAT SERPL-MCNC: 0.73 MG/DL (ref 0.57–1)
DEPRECATED RDW RBC AUTO: 42 FL (ref 37–54)
EGFRCR SERPLBLD CKD-EPI 2021: 112.9 ML/MIN/1.73
EOSINOPHIL # BLD AUTO: 0.2 10*3/MM3 (ref 0–0.4)
EOSINOPHIL NFR BLD AUTO: 2 % (ref 0.3–6.2)
ERYTHROCYTE [DISTWIDTH] IN BLOOD BY AUTOMATED COUNT: 13.3 % (ref 12.3–15.4)
GLUCOSE SERPL-MCNC: 95 MG/DL (ref 65–99)
GLUCOSE UR STRIP-MCNC: NEGATIVE MG/DL
HCT VFR BLD AUTO: 35.4 % (ref 34–46.6)
HGB BLD-MCNC: 11.6 G/DL (ref 12–15.9)
HGB UR QL STRIP.AUTO: ABNORMAL
HYALINE CASTS UR QL AUTO: ABNORMAL /LPF
KETONES UR QL STRIP: NEGATIVE
LEUKOCYTE ESTERASE UR QL STRIP.AUTO: ABNORMAL
LYMPHOCYTES # BLD AUTO: 2.4 10*3/MM3 (ref 0.7–3.1)
LYMPHOCYTES NFR BLD AUTO: 24.1 % (ref 19.6–45.3)
MCH RBC QN AUTO: 30.2 PG (ref 26.6–33)
MCHC RBC AUTO-ENTMCNC: 32.7 G/DL (ref 31.5–35.7)
MCV RBC AUTO: 92.3 FL (ref 79–97)
MONOCYTES # BLD AUTO: 0.5 10*3/MM3 (ref 0.1–0.9)
MONOCYTES NFR BLD AUTO: 5.4 % (ref 5–12)
NEUTROPHILS NFR BLD AUTO: 6.8 10*3/MM3 (ref 1.7–7)
NEUTROPHILS NFR BLD AUTO: 67.9 % (ref 42.7–76)
NITRITE UR QL STRIP: NEGATIVE
NRBC BLD AUTO-RTO: 0 /100 WBC (ref 0–0.2)
PH UR STRIP.AUTO: 7.5 [PH] (ref 5–8)
PLATELET # BLD AUTO: 309 10*3/MM3 (ref 140–450)
PMV BLD AUTO: 7.5 FL (ref 6–12)
POTASSIUM SERPL-SCNC: 3.7 MMOL/L (ref 3.5–5.2)
PROT UR QL STRIP: NEGATIVE
QT INTERVAL: 359 MS
RBC # BLD AUTO: 3.83 10*6/MM3 (ref 3.77–5.28)
RBC # UR STRIP: ABNORMAL /HPF
REF LAB TEST METHOD: ABNORMAL
SODIUM SERPL-SCNC: 135 MMOL/L (ref 136–145)
SP GR UR STRIP: 1.01 (ref 1–1.03)
SQUAMOUS #/AREA URNS HPF: ABNORMAL /HPF
TSH SERPL DL<=0.05 MIU/L-ACNC: 1.21 UIU/ML (ref 0.27–4.2)
UROBILINOGEN UR QL STRIP: ABNORMAL
WBC # UR STRIP: ABNORMAL /HPF
WBC NRBC COR # BLD: 10.1 10*3/MM3 (ref 3.4–10.8)

## 2023-04-27 PROCEDURE — 84443 ASSAY THYROID STIM HORMONE: CPT | Performed by: EMERGENCY MEDICINE

## 2023-04-27 PROCEDURE — 93005 ELECTROCARDIOGRAM TRACING: CPT | Performed by: EMERGENCY MEDICINE

## 2023-04-27 PROCEDURE — 99283 EMERGENCY DEPT VISIT LOW MDM: CPT

## 2023-04-27 PROCEDURE — 80048 BASIC METABOLIC PNL TOTAL CA: CPT | Performed by: EMERGENCY MEDICINE

## 2023-04-27 PROCEDURE — 85025 COMPLETE CBC W/AUTO DIFF WBC: CPT | Performed by: EMERGENCY MEDICINE

## 2023-04-27 PROCEDURE — 81001 URINALYSIS AUTO W/SCOPE: CPT | Performed by: EMERGENCY MEDICINE

## 2023-04-27 PROCEDURE — 36415 COLL VENOUS BLD VENIPUNCTURE: CPT

## 2023-04-27 PROCEDURE — 93005 ELECTROCARDIOGRAM TRACING: CPT

## 2023-04-27 RX ORDER — DOCUSATE SODIUM 100 MG/1
100 CAPSULE, LIQUID FILLED ORAL 2 TIMES DAILY
COMMUNITY

## 2023-04-27 RX ORDER — METOPROLOL TARTRATE 5 MG/5ML
5 INJECTION INTRAVENOUS ONCE
Status: DISCONTINUED | OUTPATIENT
Start: 2023-04-27 | End: 2023-04-27

## 2023-04-27 RX ORDER — SODIUM CHLORIDE 0.9 % (FLUSH) 0.9 %
10 SYRINGE (ML) INJECTION AS NEEDED
Status: DISCONTINUED | OUTPATIENT
Start: 2023-04-27 | End: 2023-04-27 | Stop reason: HOSPADM

## 2023-04-27 RX ADMIN — LABETALOL HYDROCHLORIDE 300 MG: 100 TABLET, FILM COATED ORAL at 20:02

## 2023-04-28 NOTE — ED PROVIDER NOTES
Subjective   History of Present Illness  Patient is a 32-year-old female who is several days postpartum.  She states she developed palpitations and high blood pressure.  She does have a history of high blood pressure and tachycardia.  She is currently on labetalol and has not taken her evening dose at this point yet she has no other associated complaints.        Review of Systems    Past Medical History:   Diagnosis Date   • Cancer     Denies cancer, benigh bladder tumor   • Hypertension    • Injury of back    • Tachycardia        Allergies   Allergen Reactions   • Aspirin Hives   • Ibuprofen Hives   • Sumatriptan Palpitations   • Sulfa Antibiotics Other (See Comments)     Abdominal pain       Past Surgical History:   Procedure Laterality Date   • ADENOIDECTOMY     • BLADDER SURGERY     • LEEP     • TONSILLECTOMY         Family History   Problem Relation Age of Onset   • Hypertension Mother    • Hypertension Maternal Grandfather    • Diabetes Maternal Grandfather        Social History     Socioeconomic History   • Marital status:    Tobacco Use   • Smoking status: Never   • Smokeless tobacco: Never   Vaping Use   • Vaping Use: Never used   Substance and Sexual Activity   • Alcohol use: Not Currently   • Drug use: Never           Objective   Physical Exam  Neck has no adenopathy JVD or bruits.  Lungs are clear.  Heart has regular rate rhythm without murmur rub or gallop.  Chest is nontender.  Abdomen is soft nontender.  Extremity exam is unremarkable.  Procedures     My EKG interpretation was normal sinus rhythm at a rate of 97 with no acute ST change      ED Course            Results for orders placed or performed during the hospital encounter of 04/27/23   Basic Metabolic Panel    Specimen: Blood   Result Value Ref Range    Glucose 95 65 - 99 mg/dL    BUN 10 6 - 20 mg/dL    Creatinine 0.73 0.57 - 1.00 mg/dL    Sodium 135 (L) 136 - 145 mmol/L    Potassium 3.7 3.5 - 5.2 mmol/L    Chloride 101 98 - 107 mmol/L     CO2 23.0 22.0 - 29.0 mmol/L    Calcium 9.2 8.6 - 10.5 mg/dL    BUN/Creatinine Ratio 13.7 7.0 - 25.0    Anion Gap 11.0 5.0 - 15.0 mmol/L    eGFR 112.9 >60.0 mL/min/1.73   TSH    Specimen: Blood   Result Value Ref Range    TSH 1.210 0.270 - 4.200 uIU/mL   Urinalysis With Microscopic If Indicated (No Culture) - Urine, Clean Catch    Specimen: Urine, Clean Catch   Result Value Ref Range    Color, UA Yellow Yellow, Straw    Appearance, UA Cloudy (A) Clear    pH, UA 7.5 5.0 - 8.0    Specific Gravity, UA 1.010 1.005 - 1.030    Glucose, UA Negative Negative    Ketones, UA Negative Negative    Bilirubin, UA Negative Negative    Blood, UA Moderate (2+) (A) Negative    Protein, UA Negative Negative    Leuk Esterase, UA Small (1+) (A) Negative    Nitrite, UA Negative Negative    Urobilinogen, UA 0.2 E.U./dL 0.2 - 1.0 E.U./dL   CBC Auto Differential    Specimen: Blood   Result Value Ref Range    WBC 10.10 3.40 - 10.80 10*3/mm3    RBC 3.83 3.77 - 5.28 10*6/mm3    Hemoglobin 11.6 (L) 12.0 - 15.9 g/dL    Hematocrit 35.4 34.0 - 46.6 %    MCV 92.3 79.0 - 97.0 fL    MCH 30.2 26.6 - 33.0 pg    MCHC 32.7 31.5 - 35.7 g/dL    RDW 13.3 12.3 - 15.4 %    RDW-SD 42.0 37.0 - 54.0 fl    MPV 7.5 6.0 - 12.0 fL    Platelets 309 140 - 450 10*3/mm3    Neutrophil % 67.9 42.7 - 76.0 %    Lymphocyte % 24.1 19.6 - 45.3 %    Monocyte % 5.4 5.0 - 12.0 %    Eosinophil % 2.0 0.3 - 6.2 %    Basophil % 0.6 0.0 - 1.5 %    Neutrophils, Absolute 6.80 1.70 - 7.00 10*3/mm3    Lymphocytes, Absolute 2.40 0.70 - 3.10 10*3/mm3    Monocytes, Absolute 0.50 0.10 - 0.90 10*3/mm3    Eosinophils, Absolute 0.20 0.00 - 0.40 10*3/mm3    Basophils, Absolute 0.10 0.00 - 0.20 10*3/mm3    nRBC 0.0 0.0 - 0.2 /100 WBC   Urinalysis, Microscopic Only - Urine, Clean Catch    Specimen: Urine, Clean Catch   Result Value Ref Range    RBC, UA 6-12 (A) None Seen /HPF    WBC, UA 13-20 (A) None Seen /HPF    Bacteria, UA None Seen None Seen /HPF    Squamous Epithelial Cells, UA 0-2 None  Seen, 0-2 /HPF    Hyaline Casts, UA None Seen None Seen /LPF    Methodology Automated Microscopy    ECG 12 Lead Chest Pain   Result Value Ref Range    QT Interval 359 ms     No radiology results for the last day                                    Medical Decision Making  Patient metabolic panel shows no renal insufficiency or electrolyte abnormality.  There is no evidence acute infectious process.  She has no ectopy on EKG or monitor.  Patient did take her labetalol pill from home.  Her blood pressure 130/80 and she is has a heart rate of 90.  She will be discharged.  She will follow with MD for further outpatient evaluation as needed.    Amount and/or Complexity of Data Reviewed  Labs: ordered. Decision-making details documented in ED Course.  ECG/medicine tests: ordered and independent interpretation performed.      Risk  Prescription drug management.          Final diagnoses:   Hypertension, unspecified type   Palpitations       ED Disposition  ED Disposition     ED Disposition   Discharge    Condition   Stable    Comment   --             No follow-up provider specified.       Medication List      No changes were made to your prescriptions during this visit.          Rigo Rodriguez MD  04/27/23 2025

## 2023-04-29 ENCOUNTER — HOSPITAL ENCOUNTER (EMERGENCY)
Facility: HOSPITAL | Age: 33
Discharge: HOME | End: 2023-04-29
Attending: EMERGENCY MEDICINE
Payer: COMMERCIAL

## 2023-04-29 ENCOUNTER — APPOINTMENT (OUTPATIENT)
Dept: CT IMAGING | Facility: HOSPITAL | Age: 33
End: 2023-04-29
Payer: COMMERCIAL

## 2023-04-29 VITALS
WEIGHT: 286.38 LBS | OXYGEN SATURATION: 98 % | DIASTOLIC BLOOD PRESSURE: 71 MMHG | TEMPERATURE: 98.1 F | HEART RATE: 68 BPM | RESPIRATION RATE: 18 BRPM | HEIGHT: 69 IN | SYSTOLIC BLOOD PRESSURE: 130 MMHG | BODY MASS INDEX: 42.42 KG/M2

## 2023-04-29 DIAGNOSIS — I10 HYPERTENSION, UNSPECIFIED TYPE: ICD-10-CM

## 2023-04-29 DIAGNOSIS — R07.9 CHEST PAIN, UNSPECIFIED TYPE: Primary | ICD-10-CM

## 2023-04-29 LAB
ALBUMIN SERPL-MCNC: 3.6 G/DL (ref 3.5–5.2)
ALBUMIN/GLOB SERPL: 1.4 G/DL
ALP SERPL-CCNC: 113 U/L (ref 39–117)
ALT SERPL W P-5'-P-CCNC: 30 U/L (ref 1–33)
ANION GAP SERPL CALCULATED.3IONS-SCNC: 10 MMOL/L (ref 5–15)
AST SERPL-CCNC: 20 U/L (ref 1–32)
BASOPHILS # BLD AUTO: 0.1 10*3/MM3 (ref 0–0.2)
BASOPHILS NFR BLD AUTO: 0.8 % (ref 0–1.5)
BILIRUB SERPL-MCNC: 0.2 MG/DL (ref 0–1.2)
BUN SERPL-MCNC: 9 MG/DL (ref 6–20)
BUN/CREAT SERPL: 11.7 (ref 7–25)
CALCIUM SPEC-SCNC: 9 MG/DL (ref 8.6–10.5)
CHLORIDE SERPL-SCNC: 107 MMOL/L (ref 98–107)
CO2 SERPL-SCNC: 24 MMOL/L (ref 22–29)
CREAT SERPL-MCNC: 0.77 MG/DL (ref 0.57–1)
D DIMER PPP FEU-MCNC: 0.81 MG/L (FEU) (ref 0–0.5)
DEPRECATED RDW RBC AUTO: 42.9 FL (ref 37–54)
EGFRCR SERPLBLD CKD-EPI 2021: 105.9 ML/MIN/1.73
EOSINOPHIL # BLD AUTO: 0.2 10*3/MM3 (ref 0–0.4)
EOSINOPHIL NFR BLD AUTO: 1.9 % (ref 0.3–6.2)
ERYTHROCYTE [DISTWIDTH] IN BLOOD BY AUTOMATED COUNT: 13 % (ref 12.3–15.4)
GLOBULIN UR ELPH-MCNC: 2.6 GM/DL
GLUCOSE SERPL-MCNC: 97 MG/DL (ref 65–99)
HCT VFR BLD AUTO: 33.8 % (ref 34–46.6)
HGB BLD-MCNC: 11.6 G/DL (ref 12–15.9)
LYMPHOCYTES # BLD AUTO: 2 10*3/MM3 (ref 0.7–3.1)
LYMPHOCYTES NFR BLD AUTO: 26.3 % (ref 19.6–45.3)
MCH RBC QN AUTO: 30.5 PG (ref 26.6–33)
MCHC RBC AUTO-ENTMCNC: 34.3 G/DL (ref 31.5–35.7)
MCV RBC AUTO: 89 FL (ref 79–97)
MONOCYTES # BLD AUTO: 0.4 10*3/MM3 (ref 0.1–0.9)
MONOCYTES NFR BLD AUTO: 5.5 % (ref 5–12)
NEUTROPHILS NFR BLD AUTO: 5.1 10*3/MM3 (ref 1.7–7)
NEUTROPHILS NFR BLD AUTO: 65.5 % (ref 42.7–76)
NRBC BLD AUTO-RTO: 0 /100 WBC (ref 0–0.2)
PLATELET # BLD AUTO: 294 10*3/MM3 (ref 140–450)
PMV BLD AUTO: 7.5 FL (ref 6–12)
POTASSIUM SERPL-SCNC: 3.8 MMOL/L (ref 3.5–5.2)
PROT SERPL-MCNC: 6.2 G/DL (ref 6–8.5)
RBC # BLD AUTO: 3.8 10*6/MM3 (ref 3.77–5.28)
SODIUM SERPL-SCNC: 141 MMOL/L (ref 136–145)
TROPONIN T SERPL HS-MCNC: 7 NG/L
WBC NRBC COR # BLD: 7.8 10*3/MM3 (ref 3.4–10.8)

## 2023-04-29 PROCEDURE — 99283 EMERGENCY DEPT VISIT LOW MDM: CPT

## 2023-04-29 PROCEDURE — 85025 COMPLETE CBC W/AUTO DIFF WBC: CPT | Performed by: EMERGENCY MEDICINE

## 2023-04-29 PROCEDURE — 36415 COLL VENOUS BLD VENIPUNCTURE: CPT

## 2023-04-29 PROCEDURE — 71275 CT ANGIOGRAPHY CHEST: CPT

## 2023-04-29 PROCEDURE — 93005 ELECTROCARDIOGRAM TRACING: CPT | Performed by: EMERGENCY MEDICINE

## 2023-04-29 PROCEDURE — 85379 FIBRIN DEGRADATION QUANT: CPT | Performed by: EMERGENCY MEDICINE

## 2023-04-29 PROCEDURE — 25510000001 IOPAMIDOL PER 1 ML: Performed by: EMERGENCY MEDICINE

## 2023-04-29 PROCEDURE — 84484 ASSAY OF TROPONIN QUANT: CPT | Performed by: EMERGENCY MEDICINE

## 2023-04-29 PROCEDURE — 80053 COMPREHEN METABOLIC PANEL: CPT | Performed by: EMERGENCY MEDICINE

## 2023-04-29 RX ADMIN — IOPAMIDOL 100 ML: 755 INJECTION, SOLUTION INTRAVENOUS at 06:07

## 2023-04-29 NOTE — ED PROVIDER NOTES
Subjective   History of Present Illness  32-year-old female who is status post vaginal delivery at [facility] per Dr. Whitlock on Monday complains of elevated blood pressure, headache, chest pain, right-sided, pleuritic onset this evening.  Patient was seen for elevated blood pressure palpitations Thursday evening with an unremarkable evaluation and improvement in her blood pressure.  Patient has a history of hypertension independent of her pregnancy which she states she has dealt with since high school.        Review of Systems   Respiratory: Positive for shortness of breath.    Cardiovascular: Positive for chest pain.   Neurological: Positive for headaches.   All other systems reviewed and are negative.      Past Medical History:   Diagnosis Date   • Cancer     Denies cancer, benigh bladder tumor   • Hypertension    • Injury of back    • Tachycardia        Allergies   Allergen Reactions   • Aspirin Hives   • Ibuprofen Hives   • Sumatriptan Palpitations   • Sulfa Antibiotics Other (See Comments)     Abdominal pain       Past Surgical History:   Procedure Laterality Date   • ADENOIDECTOMY     • BLADDER SURGERY     • LEEP     • TONSILLECTOMY         Family History   Problem Relation Age of Onset   • Hypertension Mother    • Hypertension Maternal Grandfather    • Diabetes Maternal Grandfather        Social History     Socioeconomic History   • Marital status:    Tobacco Use   • Smoking status: Never   • Smokeless tobacco: Never   Vaping Use   • Vaping Use: Never used   Substance and Sexual Activity   • Alcohol use: Not Currently   • Drug use: Never           Objective   Physical Exam  Constitutional:       Appearance: Normal appearance.   HENT:      Head: Normocephalic and atraumatic.      Mouth/Throat:      Mouth: Mucous membranes are moist.      Pharynx: Oropharynx is clear.   Eyes:      Conjunctiva/sclera: Conjunctivae normal.      Pupils: Pupils are equal, round, and reactive to light.    Cardiovascular:      Rate and Rhythm: Normal rate and regular rhythm.      Heart sounds: Normal heart sounds.   Pulmonary:      Effort: Pulmonary effort is normal.      Breath sounds: Normal breath sounds.   Abdominal:      General: Bowel sounds are normal. There is no distension.      Palpations: Abdomen is soft.      Tenderness: There is no abdominal tenderness.   Musculoskeletal:      Comments: Lower extremities with normal inspection, no pitting edema   Skin:     General: Skin is warm and dry.      Capillary Refill: Capillary refill takes less than 2 seconds.   Neurological:      Mental Status: She is alert and oriented to person, place, and time.      Cranial Nerves: No cranial nerve deficit.      Sensory: No sensory deficit.      Motor: No weakness.   Psychiatric:         Mood and Affect: Mood normal.         Behavior: Behavior normal.         Procedures           ED Course                                           Medical Decision Making  Patient appears well, blood pressure improved, chest pain resolved.  I see no evidence that her hypertension is connected to her pregnancy, no evidence of preeclampsia.  Patient has had decreased sleep and increased stress as her son has had jaundice.  I  encouraged her to get as much rest as possible, patient desires discharge.    Amount and/or Complexity of Data Reviewed  Labs: ordered.     Details: White blood cell count normal, troponin normal, D-dimer elevated, normal LFTs  Radiology: ordered.  ECG/medicine tests: ordered.     Details: EKG interpretation: Normal sinus rhythm, rate 69, no acute ST change      Risk  Prescription drug management.          Final diagnoses:   Chest pain, unspecified type   Hypertension, unspecified type       ED Disposition  ED Disposition     ED Disposition   Discharge    Condition   Stable    Comment   --               Follow-up closely with your doctor             Medication List      No changes were made to your prescriptions during  this visit.          Ryder Mak MD  04/29/23 0672

## 2023-04-29 NOTE — ED NOTES
Pt delivered a baby Monday and has been hypertensive ever since. Pt states she has chronic hypertension and is on labetalol to control it. Pt states her blood pressure was normal before and during pregnancy and has not a recent medication change. Pt states she has also been having a generalized headache, Heart palpitations, and dizziness along with the hypertension. Pt states when she took her BP at home it was 157/100.

## 2023-05-01 LAB — QT INTERVAL: 401 MS

## 2023-05-03 LAB — QT INTERVAL: 359 MS

## 2024-11-20 NOTE — PROGRESS NOTES
"   Physical Therapy Daily Progress Note    Patient: Miranda Paredes   : 1990  Diagnosis/ICD-10 Code:  Pain, neck [M54.2]  Referring practitioner: Pam VIRGEN*  Date of Initial Visit: Type: THERAPY  Noted: 2019  Today's Date: 10/3/2019  Patient seen for 10 sessions         Miranda Paredes reports:  Soreness in cervical spine and left scapula after using \"massage chair\" earlier this week.  Reports symptoms beginning to go away at this time.      Objective   See Exercise, Manual, and Modality Logs for complete treatment.       Assessment/Plan     Tolerates treatment well this date.  Observed soft tissue restriction near medial border of left scapula, addressed with manual therapy.  Patient progressing well.     Progress per Plan of Care           Manual Therapy:    20     mins  58662;  Therapeutic Exercise:    10     mins  02174;     Neuromuscular Teddy:        mins  56112;    Therapeutic Activity:          mins  18618;     Gait Training:           mins  29321;     Ultrasound:          mins  71150;    Electrical Stimulation:         mins  77652 ( );  Dry Needling          mins self-pay    Timed Treatment:   30   mins   Total Treatment:     30   mins    Toni Powell PT  Physical Therapist                    "
Was not done